# Patient Record
Sex: FEMALE | Race: OTHER | URBAN - METROPOLITAN AREA
[De-identification: names, ages, dates, MRNs, and addresses within clinical notes are randomized per-mention and may not be internally consistent; named-entity substitution may affect disease eponyms.]

---

## 2017-04-17 ENCOUNTER — IMPORTED ENCOUNTER (OUTPATIENT)
Dept: URBAN - METROPOLITAN AREA CLINIC 50 | Facility: CLINIC | Age: 82
End: 2017-04-17

## 2017-05-15 ENCOUNTER — IMPORTED ENCOUNTER (OUTPATIENT)
Dept: URBAN - METROPOLITAN AREA CLINIC 50 | Facility: CLINIC | Age: 82
End: 2017-05-15

## 2017-05-15 NOTE — PATIENT DISCUSSION
"""""""A. T.(Artificial Tears) BID - QID(2-4 times a day)Gel QHS(at bedtime)W. C.(warm compresses) FSO po BID (Flaxseed Oil

## 2017-06-19 ENCOUNTER — IMPORTED ENCOUNTER (OUTPATIENT)
Dept: URBAN - METROPOLITAN AREA CLINIC 50 | Facility: CLINIC | Age: 82
End: 2017-06-19

## 2018-02-16 NOTE — PATIENT DISCUSSION
PLAN: CE/IOL OS THEN OD WITH I-STENT (PENDING FIELD RESULTS), NO ADVANCED SECONDARY TO PRISM IN RX. IF CUSTOM, GOAL -2.00 OU. P.O WITH MEM. VAN NEEDED.

## 2018-02-23 NOTE — PATIENT DISCUSSION
PLAN: CE/IOL OS THEN OD WITH I-STENT, NO ADVANCED SECONDARY TO PRISM IN RX. IF CUSTOM, GOAL -2.00 OU. P.O WITH MEM. VAN NEEDED.

## 2018-03-22 NOTE — PATIENT DISCUSSION
The IOP is above the target range. Add Timolol 0.5% twice a day check IOP with [address] tomorrow.

## 2018-03-22 NOTE — PATIENT DISCUSSION
The IOP is above the target range. Add Timolol 0.5% twice a day check IOP with [facility] tomorrow.

## 2018-04-02 NOTE — PATIENT DISCUSSION
3 week PO: Patient is doing well post-operatively. The importance of post-op drop compliance was emphasized. Drop scheduled reviewed with patient. Patient to call if any visual changes or concerns.

## 2019-02-20 NOTE — PROCEDURE NOTE: SURGICAL
<p>Prior to commencing surgery patient identification, surgical procedure, site, and side were confirmed by Dr. Gonsalo Hernandez. Following topical proparacaine anesthesia, the patient was positioned at the YAG laser, a contact lens coupled to the cornea with methylcellulose and an axial posterior capsulotomy performed without complication using 3.0 Mj x 11. Excess methylcellulose was washed from the eye, one drop of Alphagan was instilled and the patient returned to the holding area having tolerated the procedure well and without complication. </p><p>MRN:[id number]</p>

## 2019-02-27 NOTE — PROCEDURE NOTE: SURGICAL
<p>Prior to commencing surgery patient identification, surgical procedure, site, and side were confirmed by Dr. Celestino Ryan. Following topical proparacaine anesthesia, the patient was positioned at the YAG laser, a contact lens coupled to the cornea with methylcellulose and an axial posterior capsulotomy performed without complication using 3.2 Mj x 12. Excess methylcellulose was washed from the eye, one drop of Alphagan was instilled and the patient returned to the holding area having tolerated the procedure well and without complication. </p><p>MRN:[id number]</p>

## 2019-06-10 NOTE — PATIENT DISCUSSION
----- Message from Karli Fish sent at 6/10/2019  8:18 AM CDT -----  No. [phone]   Patient returned your call.      No prescription for glasses or contact lenses was given today.

## 2020-07-18 NOTE — PATIENT DISCUSSION
Patient understands condition, prognosis and need for follow up care. Alert-The patient is alert, awake and responds to voice. The patient is oriented to time, place, and person. The triage nurse is able to obtain subjective information.

## 2021-04-17 ASSESSMENT — VISUAL ACUITY
OS_SC: 20/40=
OS_SC: 20/40-
OD_SC: 20/50-
OD_PH: 20/40+
OS_SC: 20/40+1
OS_PH: 20/30
OD_SC: 20/30+
OD_SC: 20/40=
OS_PH: 20/30=

## 2021-04-17 ASSESSMENT — TONOMETRY
OD_IOP_MMHG: 18
OS_IOP_MMHG: 18
OD_IOP_MMHG: 14
OS_IOP_MMHG: 11
OS_IOP_MMHG: 14
OD_IOP_MMHG: 11